# Patient Record
Sex: FEMALE | Race: WHITE | NOT HISPANIC OR LATINO
[De-identification: names, ages, dates, MRNs, and addresses within clinical notes are randomized per-mention and may not be internally consistent; named-entity substitution may affect disease eponyms.]

---

## 2017-08-11 ENCOUNTER — APPOINTMENT (OUTPATIENT)
Dept: OPHTHALMOLOGY | Facility: CLINIC | Age: 57
End: 2017-08-11
Payer: COMMERCIAL

## 2017-08-11 ENCOUNTER — APPOINTMENT (OUTPATIENT)
Dept: OPHTHALMOLOGY | Facility: CLINIC | Age: 57
End: 2017-08-11

## 2017-08-11 DIAGNOSIS — H35.362 DRUSEN (DEGENERATIVE) OF MACULA, LEFT EYE: ICD-10-CM

## 2017-08-11 DIAGNOSIS — H25.13 AGE-RELATED NUCLEAR CATARACT, BILATERAL: ICD-10-CM

## 2017-08-11 PROCEDURE — 92014 COMPRE OPH EXAM EST PT 1/>: CPT

## 2017-08-11 PROCEDURE — 92134 CPTRZ OPH DX IMG PST SGM RTA: CPT

## 2017-08-16 PROBLEM — H35.362 MACULAR DRUSEN, LEFT: Status: ACTIVE | Noted: 2017-08-16

## 2017-08-16 PROBLEM — H25.13 CATARACT, NUCLEAR, BILATERAL: Status: ACTIVE | Noted: 2017-08-16

## 2017-09-11 ENCOUNTER — APPOINTMENT (OUTPATIENT)
Dept: OPHTHALMOLOGY | Facility: CLINIC | Age: 57
End: 2017-09-11
Payer: COMMERCIAL

## 2017-09-11 DIAGNOSIS — H40.003 PREGLAUCOMA, UNSPECIFIED, BILATERAL: ICD-10-CM

## 2017-09-11 PROCEDURE — 92083 EXTENDED VISUAL FIELD XM: CPT

## 2018-05-15 ENCOUNTER — OUTPATIENT (OUTPATIENT)
Dept: OUTPATIENT SERVICES | Facility: HOSPITAL | Age: 58
LOS: 1 days | End: 2018-05-15
Payer: COMMERCIAL

## 2018-05-15 VITALS
TEMPERATURE: 98 F | HEART RATE: 63 BPM | RESPIRATION RATE: 20 BRPM | DIASTOLIC BLOOD PRESSURE: 67 MMHG | OXYGEN SATURATION: 100 % | SYSTOLIC BLOOD PRESSURE: 97 MMHG | HEIGHT: 63 IN | WEIGHT: 115.08 LBS

## 2018-05-15 DIAGNOSIS — G47.30 SLEEP APNEA, UNSPECIFIED: ICD-10-CM

## 2018-05-15 DIAGNOSIS — Z98.891 HISTORY OF UTERINE SCAR FROM PREVIOUS SURGERY: Chronic | ICD-10-CM

## 2018-05-15 DIAGNOSIS — Z01.818 ENCOUNTER FOR OTHER PREPROCEDURAL EXAMINATION: ICD-10-CM

## 2018-05-15 DIAGNOSIS — N20.0 CALCULUS OF KIDNEY: ICD-10-CM

## 2018-05-15 LAB
ANION GAP SERPL CALC-SCNC: 12 MMOL/L — SIGNIFICANT CHANGE UP (ref 5–17)
BUN SERPL-MCNC: 15 MG/DL — SIGNIFICANT CHANGE UP (ref 7–23)
CALCIUM SERPL-MCNC: 9.9 MG/DL — SIGNIFICANT CHANGE UP (ref 8.4–10.5)
CHLORIDE SERPL-SCNC: 101 MMOL/L — SIGNIFICANT CHANGE UP (ref 96–108)
CO2 SERPL-SCNC: 28 MMOL/L — SIGNIFICANT CHANGE UP (ref 22–31)
CREAT SERPL-MCNC: 0.81 MG/DL — SIGNIFICANT CHANGE UP (ref 0.5–1.3)
GLUCOSE SERPL-MCNC: 78 MG/DL — SIGNIFICANT CHANGE UP (ref 70–99)
HCT VFR BLD CALC: 39.2 % — SIGNIFICANT CHANGE UP (ref 34.5–45)
HGB BLD-MCNC: 13 G/DL — SIGNIFICANT CHANGE UP (ref 11.5–15.5)
MCHC RBC-ENTMCNC: 29.3 PG — SIGNIFICANT CHANGE UP (ref 27–34)
MCHC RBC-ENTMCNC: 33.2 GM/DL — SIGNIFICANT CHANGE UP (ref 32–36)
MCV RBC AUTO: 88.5 FL — SIGNIFICANT CHANGE UP (ref 80–100)
PLATELET # BLD AUTO: 211 K/UL — SIGNIFICANT CHANGE UP (ref 150–400)
POTASSIUM SERPL-MCNC: 4 MMOL/L — SIGNIFICANT CHANGE UP (ref 3.5–5.3)
POTASSIUM SERPL-SCNC: 4 MMOL/L — SIGNIFICANT CHANGE UP (ref 3.5–5.3)
RBC # BLD: 4.43 M/UL — SIGNIFICANT CHANGE UP (ref 3.8–5.2)
RBC # FLD: 12.6 % — SIGNIFICANT CHANGE UP (ref 10.3–14.5)
SODIUM SERPL-SCNC: 141 MMOL/L — SIGNIFICANT CHANGE UP (ref 135–145)
WBC # BLD: 5.05 K/UL — SIGNIFICANT CHANGE UP (ref 3.8–10.5)
WBC # FLD AUTO: 5.05 K/UL — SIGNIFICANT CHANGE UP (ref 3.8–10.5)

## 2018-05-15 RX ORDER — CEFAZOLIN SODIUM 1 G
2000 VIAL (EA) INJECTION ONCE
Qty: 0 | Refills: 0 | Status: DISCONTINUED | OUTPATIENT
Start: 2018-05-22 | End: 2018-06-06

## 2018-05-15 NOTE — H&P PST ADULT - RS GEN PE MLT RESP DETAILS PC
normal/respirations non-labored/good air movement/breath sounds equal/clear to auscultation bilaterally/airway patent

## 2018-05-15 NOTE — H&P PST ADULT - PMH
Sleep apnea Asthma  with seasonal allergies  GERD (gastroesophageal reflux disease)    Kidney stone    Sleep apnea  uses  - mild Environmental asthma    GERD (gastroesophageal reflux disease)    Kidney stone    Sleep apnea  uses  - mild

## 2018-05-15 NOTE — H&P PST ADULT - HISTORY OF PRESENT ILLNESS
58 yr old female with history of Recurrent UTI , had work up that revealed left sided kidney stone. Now coming in for Left ESWL, Cystoscopy, Left stent insertion on 5/22/18.

## 2018-05-21 ENCOUNTER — TRANSCRIPTION ENCOUNTER (OUTPATIENT)
Age: 58
End: 2018-05-21

## 2018-05-22 ENCOUNTER — OUTPATIENT (OUTPATIENT)
Dept: OUTPATIENT SERVICES | Facility: HOSPITAL | Age: 58
LOS: 1 days | End: 2018-05-22
Payer: COMMERCIAL

## 2018-05-22 VITALS
SYSTOLIC BLOOD PRESSURE: 97 MMHG | RESPIRATION RATE: 18 BRPM | OXYGEN SATURATION: 100 % | DIASTOLIC BLOOD PRESSURE: 62 MMHG | TEMPERATURE: 97 F | HEART RATE: 58 BPM

## 2018-05-22 VITALS
SYSTOLIC BLOOD PRESSURE: 85 MMHG | HEIGHT: 63 IN | OXYGEN SATURATION: 97 % | TEMPERATURE: 98 F | HEART RATE: 63 BPM | DIASTOLIC BLOOD PRESSURE: 58 MMHG | WEIGHT: 115.08 LBS | RESPIRATION RATE: 18 BRPM

## 2018-05-22 DIAGNOSIS — N20.0 CALCULUS OF KIDNEY: ICD-10-CM

## 2018-05-22 DIAGNOSIS — Z98.891 HISTORY OF UTERINE SCAR FROM PREVIOUS SURGERY: Chronic | ICD-10-CM

## 2018-05-22 PROCEDURE — 87086 URINE CULTURE/COLONY COUNT: CPT

## 2018-05-22 PROCEDURE — C2617: CPT

## 2018-05-22 PROCEDURE — 52356 CYSTO/URETERO W/LITHOTRIPSY: CPT | Mod: LT

## 2018-05-22 PROCEDURE — 76000 FLUOROSCOPY <1 HR PHYS/QHP: CPT

## 2018-05-22 PROCEDURE — 74018 RADEX ABDOMEN 1 VIEW: CPT | Mod: 26

## 2018-05-22 PROCEDURE — 87186 SC STD MICRODIL/AGAR DIL: CPT

## 2018-05-22 PROCEDURE — G0463: CPT

## 2018-05-22 PROCEDURE — 80048 BASIC METABOLIC PNL TOTAL CA: CPT

## 2018-05-22 PROCEDURE — 85027 COMPLETE CBC AUTOMATED: CPT

## 2018-05-22 PROCEDURE — 74018 RADEX ABDOMEN 1 VIEW: CPT

## 2018-05-22 PROCEDURE — C1758: CPT

## 2018-05-22 PROCEDURE — C1769: CPT

## 2018-05-22 RX ORDER — ONDANSETRON 8 MG/1
4 TABLET, FILM COATED ORAL ONCE
Qty: 0 | Refills: 0 | Status: COMPLETED | OUTPATIENT
Start: 2018-05-22 | End: 2018-05-22

## 2018-05-22 RX ORDER — MONTELUKAST 4 MG/1
1 TABLET, CHEWABLE ORAL
Qty: 0 | Refills: 0 | COMMUNITY

## 2018-05-22 RX ORDER — HYDROMORPHONE HYDROCHLORIDE 2 MG/ML
0.5 INJECTION INTRAMUSCULAR; INTRAVENOUS; SUBCUTANEOUS
Qty: 0 | Refills: 0 | Status: DISCONTINUED | OUTPATIENT
Start: 2018-05-22 | End: 2018-05-22

## 2018-05-22 RX ORDER — OMEGA-3 ACID ETHYL ESTERS 1 G
1 CAPSULE ORAL
Qty: 0 | Refills: 0 | COMMUNITY

## 2018-05-22 RX ORDER — SODIUM CHLORIDE 9 MG/ML
1000 INJECTION INTRAMUSCULAR; INTRAVENOUS; SUBCUTANEOUS
Qty: 0 | Refills: 0 | Status: DISCONTINUED | OUTPATIENT
Start: 2018-05-22 | End: 2018-06-06

## 2018-05-22 RX ORDER — MULTIVIT-MIN/FERROUS GLUCONATE 9 MG/15 ML
1 LIQUID (ML) ORAL
Qty: 0 | Refills: 0 | COMMUNITY

## 2018-05-22 RX ORDER — LIDOCAINE HCL 20 MG/ML
0.2 VIAL (ML) INJECTION ONCE
Qty: 0 | Refills: 0 | Status: DISCONTINUED | OUTPATIENT
Start: 2018-05-22 | End: 2018-05-22

## 2018-05-22 RX ORDER — SODIUM CHLORIDE 9 MG/ML
3 INJECTION INTRAMUSCULAR; INTRAVENOUS; SUBCUTANEOUS EVERY 8 HOURS
Qty: 0 | Refills: 0 | Status: DISCONTINUED | OUTPATIENT
Start: 2018-05-22 | End: 2018-05-22

## 2018-05-22 RX ORDER — ASCORBIC ACID 60 MG
1 TABLET,CHEWABLE ORAL
Qty: 0 | Refills: 0 | COMMUNITY

## 2018-05-22 RX ORDER — L.ACIDOPH/B.ANIMALIS/B.LONGUM 15B CELL
1 CAPSULE ORAL
Qty: 0 | Refills: 0 | COMMUNITY

## 2018-05-22 RX ORDER — RALOXIFENE HYDROCHLORIDE 60 MG/1
1 TABLET, COATED ORAL
Qty: 0 | Refills: 0 | COMMUNITY

## 2018-05-22 RX ADMIN — SODIUM CHLORIDE 75 MILLILITER(S): 9 INJECTION INTRAMUSCULAR; INTRAVENOUS; SUBCUTANEOUS at 11:36

## 2018-05-22 RX ADMIN — Medication 0.5 MILLIGRAM(S): at 11:30

## 2018-05-22 RX ADMIN — ONDANSETRON 4 MILLIGRAM(S): 8 TABLET, FILM COATED ORAL at 11:00

## 2018-05-22 NOTE — ASU DISCHARGE PLAN (ADULT/PEDIATRIC). - ITEMS TO FOLLOWUP WITH YOUR PHYSICIAN'S
Please follow up with Dr Goldberg in office in 2 weeks.  Call [phone] too set up an appointment for your left ureteral stent to be removed

## 2018-05-22 NOTE — BRIEF OPERATIVE NOTE - PROCEDURE
<<-----Click on this checkbox to enter Procedure ESWL, cystoscopic, insertion of urinary stent  05/22/2018  left  Active  GGOLDBER

## 2018-05-22 NOTE — ASU DISCHARGE PLAN (ADULT/PEDIATRIC). - MEDICATION SUMMARY - MEDICATIONS TO TAKE
I will START or STAY ON the medications listed below when I get home from the hospital:    sinutrol 1 tab oral daily  -- Indication: For Home medication    green tea oral daily  -- Indication: For Home medication    Percocet 5/325 oral tablet  -- 1 tab(s) by mouth every 6 hours, As Needed -for muscle spasm - for moderate pain MDD:4  -- Caution federal law prohibits the transfer of this drug to any person other  than the person for whom it was prescribed.  May cause drowsiness.  Alcohol may intensify this effect.  Use care when operating dangerous machinery.  This prescription cannot be refilled.  This product contains acetaminophen.  Do not use  with any other product containing acetaminophen to prevent possible liver damage.  Using more of this medication than prescribed may cause serious breathing problems.    -- Indication: For post op pain control    Evista 60 mg oral tablet  -- 1 tab(s) by mouth once a day  -- Indication: For Home medication    TheraCran One oral capsule  -- 1 cap(s) by mouth once a day  -- Indication: For Home medication    Singulair 10 mg oral tablet  -- 1 tab(s) by mouth once a day  -- Indication: For Home medication    Omega Essentials 667 mg oral capsule  -- 1 cap(s) by mouth 2 times a day  -- Indication: For Home medication    Probiotic Formula oral capsule  -- 1 cap(s) by mouth once a day  -- Indication: For Home medication    TheraLith XR oral tablet  -- 1 tab(s) by mouth once a day  -- Indication: For Home medication    Calcium 500+D oral tablet, chewable  -- 1 tab(s) by mouth 2 times a day  -- Indication: For Home medication    Sandy-C 1000 mg oral tablet  -- 1 tab(s) by mouth once a day  -- Indication: For Home medication

## 2018-05-22 NOTE — ASU DISCHARGE PLAN (ADULT/PEDIATRIC). - INSTRUCTIONS
Regular diet Call [phone] to set up an appointment in 2 weeks for left ureteral stent removal

## 2019-10-02 ENCOUNTER — APPOINTMENT (OUTPATIENT)
Dept: OPHTHALMOLOGY | Facility: CLINIC | Age: 59
End: 2019-10-02

## 2020-12-07 ENCOUNTER — TRANSCRIPTION ENCOUNTER (OUTPATIENT)
Age: 60
End: 2020-12-07

## 2021-07-13 ENCOUNTER — TRANSCRIPTION ENCOUNTER (OUTPATIENT)
Age: 61
End: 2021-07-13

## 2021-07-13 PROBLEM — K21.9 GASTRO-ESOPHAGEAL REFLUX DISEASE WITHOUT ESOPHAGITIS: Chronic | Status: ACTIVE | Noted: 2018-05-15

## 2021-07-13 PROBLEM — G47.30 SLEEP APNEA, UNSPECIFIED: Chronic | Status: ACTIVE | Noted: 2018-05-15

## 2021-07-13 PROBLEM — N20.0 CALCULUS OF KIDNEY: Chronic | Status: ACTIVE | Noted: 2018-05-15

## 2021-07-13 PROBLEM — J45.909 UNSPECIFIED ASTHMA, UNCOMPLICATED: Chronic | Status: ACTIVE | Noted: 2018-05-15

## 2021-07-15 ENCOUNTER — NON-APPOINTMENT (OUTPATIENT)
Age: 61
End: 2021-07-15

## 2021-07-15 ENCOUNTER — APPOINTMENT (OUTPATIENT)
Dept: ORTHOPEDIC SURGERY | Facility: CLINIC | Age: 61
End: 2021-07-15
Payer: COMMERCIAL

## 2021-07-15 DIAGNOSIS — S52.291A OTHER FRACTURE OF SHAFT OF RIGHT ULNA, INITIAL ENCOUNTER FOR CLOSED FRACTURE: ICD-10-CM

## 2021-07-15 PROCEDURE — 99072 ADDL SUPL MATRL&STAF TM PHE: CPT

## 2021-07-15 PROCEDURE — 29065 APPL CST SHO TO HAND LNG ARM: CPT | Mod: RT

## 2021-07-15 PROCEDURE — 73090 X-RAY EXAM OF FOREARM: CPT | Mod: RT

## 2021-07-15 PROCEDURE — 99203 OFFICE O/P NEW LOW 30 MIN: CPT | Mod: 25

## 2021-07-15 NOTE — ADDENDUM
[FreeTextEntry1] : I, Emily Leon wrote this note acting as a scribe for Dr. Mars Reynolds on Jul 15, 2021.\par \par

## 2021-07-15 NOTE — HISTORY OF PRESENT ILLNESS
[de-identified] : RADHA MARTÍNEZ is a LHD 61 year female who presents for initial evaluation of the right wrist. Patient reports she banged her arm. She went to Urgent Care on 7/13/21, where xrays were taken and a splint and sling were applied. The xrays revealed a comminuted fracture at the midshaft of the ulna. She was treated with a splint.She  presents today for further treatment options.

## 2021-07-15 NOTE — PHYSICAL EXAM
[de-identified] : Patient is WDWN, alert, and in no acute distress. Breathing is unlabored. She is grossly oriented to person, place, \par and time.\par \par Right Forearm:\par ecchymosis , edema and tenderness along midshaft ulna\par shoulder and elbow FROM\par Pron/sup limited by pain\par FROM of digits\par Sensation is normal. [de-identified] : EXAM: FOREARM RIGHT - 7/13/21\par IMPRESSION: \par There is a comminuted fracture at the midshaft of the ulna. There is a small minimally displaced butterfly fracture fragment. There is slight apex dorsal lateral angulation. 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 Jul 14 2021 6:45AM\par \par \par \par 7/15/21 - AP, lateral  views of the right forearm were obtained today and revealed a comminuted fracture at the midshaft  ulna. The fracture is well-aligned.

## 2021-07-15 NOTE — END OF VISIT
[FreeTextEntry3] : All medical record entries made by the Scribe were at my,  Dr. Mars Reynolds MD., direction and personally dictated by me on 07/15/2021. I have personally reviewed the chart and agree that the record accurately reflects my personal performance of the history, physical exam, assessment and plan.\par \par

## 2021-07-15 NOTE — DISCUSSION/SUMMARY
[de-identified] : The underlying pathophysiology was reviewed with the patient. XR films were reviewed with the patient. Discussed at length the nature of the patient’s condition. The the right forearm symptoms are secondary to comminuted fracture at the midshaft of the ulna.\par \par Risks and benefits of surgical versus nonsurgical intervention(cast treatment) were discussed. I am not recommending surgery at this time due to the fracture being well-aligned. The patient has agreed to proceed with being treated in a cast. She was advised that she will be cased for a total of 6 weeks.\par A right long arm Treynor cast was applied. Proper cast care instructions were reviewed.\par Patient advised to use the arm while casted for all ADLs.\par \par Patient can continue activities as tolerated. All questions answered, understanding verbalized. Patient in agreement with plan of care. Follow up in 2 weeks for repeat XRs in the cast to confirm satisfactory fracture alignment..

## 2021-07-29 ENCOUNTER — APPOINTMENT (OUTPATIENT)
Dept: ORTHOPEDIC SURGERY | Facility: CLINIC | Age: 61
End: 2021-07-29
Payer: COMMERCIAL

## 2021-07-29 PROCEDURE — 99213 OFFICE O/P EST LOW 20 MIN: CPT

## 2021-07-29 PROCEDURE — 73090 X-RAY EXAM OF FOREARM: CPT | Mod: RT

## 2021-07-29 NOTE — PHYSICAL EXAM
[de-identified] : Patient is WDWN, alert, and in no acute distress. Breathing is unlabored. She is grossly oriented to person, place, and time.\par \par Right Forearm:\par Right short arm cast in place\par Unable to access ROM due to cast\par Digits are moving freely with brisk capillary refill\par FROM of digits\par  [de-identified] : AP, lateral views of the right forearm were obtained today and revealed a comminuted fracture at the midshaft ulna. The fracture is well-aligned.

## 2021-07-29 NOTE — END OF VISIT
[FreeTextEntry3] : All medical record entries made by the Scribe were at my,  Dr. Mars Reynolds MD., direction and personally dictated by me on 07/29/2021. I have personally reviewed the chart and agree that the record accurately reflects my personal performance of the history, physical exam, assessment and plan.\par \par

## 2021-07-29 NOTE — HISTORY OF PRESENT ILLNESS
[de-identified] : RADHA MARTÍNEZ is a LHD 61 year female who presents for follow up valuation of the right wrist. Patient reports she banged her arm. She went to Urgent Care on 7/13/21, where xrays were taken and a splint and sling were applied. The xrays revealed a comminuted fracture at the midshaft of the ulna. She presented to the office on 7/15/21 where she was treated in a short arm cast. She returns on 7/29/21 stating she overall is doing well. She is concerned of atrophy of the arm. She additionally states the cast is cutting into her arm and would like it adjusted or changed.

## 2021-07-29 NOTE — DISCUSSION/SUMMARY
[de-identified] : The underlying pathophysiology was reviewed with the patient. XR films were reviewed with the patient. Discussed at length the nature of the patient’s condition. The the right forearm symptoms are secondary to comminuted fracture at the midshaft of the ulna.\par \par Right short arm cast was adjusted (7/29/21).\par After review of XRs, the patient was advised the fracture is still well aligned and is healing. I feel strongly that with continued treatment of the fracture in a cast, the patient will regain full function.\par Instructed on ROM exercises of the shoulder, elbow, hand and wrist while casted.\par \par Patient can continue activities as tolerated. All questions answered, understanding verbalized. Patient in agreement with plan of care. Follow up in 2 weeks for repeat xrays to check alignment.

## 2021-07-29 NOTE — ADDENDUM
[FreeTextEntry1] : I, Emily Leon wrote this note acting as a scribe for Dr. Mars Reynolds on Jul 29, 2021.\par \par

## 2021-08-03 ENCOUNTER — APPOINTMENT (OUTPATIENT)
Dept: ORTHOPEDIC SURGERY | Facility: CLINIC | Age: 61
End: 2021-08-03
Payer: COMMERCIAL

## 2021-08-03 PROCEDURE — 99214 OFFICE O/P EST MOD 30 MIN: CPT | Mod: 25

## 2021-08-03 PROCEDURE — 29075 APPL CST ELBW FNGR SHORT ARM: CPT | Mod: RT

## 2021-08-03 NOTE — DISCUSSION/SUMMARY
[de-identified] : The underlying pathophysiology was reviewed with the patient. XR films were reviewed with the patient. Discussed at length the nature of the patient’s condition. The the right forearm symptoms are secondary to comminuted fracture at the midshaft of the ulna.\par \par Right short arm cast was removed and a new short arm waterproof cast was applied.\par \par Activity modifications and restrictions were discussed. Discussed she should only be swimming with a kick board. \par \par FU 2 week with Dr. Reynolds\par \par All questions were answered, all alternatives discussed and the patient is in complete agreement with that plan. Follow-up appointment as instructed. Any issues and the patient will call or come in sooner. \par

## 2021-08-03 NOTE — PROCEDURE
[de-identified] : A short arm cast was applied in clinic today. Neurovascular status was assessed pre and post placement. Patient tolerated the cast placement well, with no complaints.

## 2021-08-03 NOTE — PHYSICAL EXAM
[de-identified] : Right Upper Extremity\par o Elbow :\par ¦ Inspection/Palpation : no tenderness, no swelling, no deformities\par ¦ Range of Motion : full and painless in all planes, no crepitus\par ¦ Strength : flexion and extension 5/5\par ¦ Stability : no joint instability on provocative testing\par \par o Wrist:\par ¦ Inspection/Palpation : diffuse ulnar tenderness to palpation, no swelling or deformities\par ¦ Range of Motion : not assessed today due to fracture \par ¦ Strength : extension, flexion, ulnar deviation and radial deviation - not assessed today due to fracture. \par ¦ Stability : no joint instability on provocative testing\par ¦ Tests/Signs : Tinel's sign (-) over carpal tunnel\par o Muscle Bulk : no atrophy\par o Sensation : sensation intact to light touch\par o Skin : no skin lesions or discoloration\par o Vascular Exam : no edema or cyanosis, radial and ulnar pulses normal\par \par Left Upper Extremity\par o Wrist:\par ¦ Inspection/Palpation : no tenderness, swelling or deformities\par ¦ Range of Motion : full and painless in all planes, no crepitus\par ¦ Strength : extension, flexion, ulnar deviation and radial deviation 5/5\par ¦ Stability : no joint instability on provocative testing\par ¦ Tests/Signs : Tinel's sign (-) over carpal tunnel\par o Muscle Bulk : no atrophy\par o Sensation : sensation intact to light touch\par o Skin : no skin lesions or discoloration\par o Vascular Exam : no edema or cyanosis, radial and ulnar pulses normal\par  [de-identified] : Patient comes to today's visit with outside imaging already performed. I reviewed the images in detail with the patient and discussed the findings as highlighted below. \par \par o 7/15/21 - AP, lateral views of the right forearm were obtained in clinic by Dr. Reynolds and revealed:\par ¦  a comminuted fracture at the midshaft ulna. The fracture is well-aligned. \par \par o FOREARM RIGHT xray performed on 7/13/21 [facility]: impression: \par ¦ There is a comminuted fracture at the midshaft of the ulna, There is a small minimally displaced butterfly fracture fragment, There is slight apex dorsal lateral angulation. \par ¦ Chondrocalcinosis at the triangular fibrocartilage is noted.\par \par

## 2021-08-03 NOTE — HISTORY OF PRESENT ILLNESS
[de-identified] : RADHA MARTÍNEZ is a LHD 61 year female who presents for follow up valuation of the right wrist. Patient reports she banged her arm. She went to Urgent Care on 7/13/21, where xrays were taken and a splint and sling were applied. The xrays revealed a comminuted fracture at the midshaft of the ulna. She presented to the office on 7/15/21 where she was  evaluated treated by Dr. Reynolds in a short arm cast. She return to clinic with Dr. Reynolds on 7/29/21 stating the cast is cutting into her arm and would like it adjusted or changed. The cast was adjusted at this time. She returns today due to concerns of continued pain due to the cast. She would like to have the cast cut off and reapplied at this time. Patient states she has been swimming and notes the cast becomes wet. Patient denies any other complaints at this time.

## 2021-08-16 ENCOUNTER — APPOINTMENT (OUTPATIENT)
Dept: ORTHOPEDIC SURGERY | Facility: CLINIC | Age: 61
End: 2021-08-16
Payer: COMMERCIAL

## 2021-08-16 PROCEDURE — 99213 OFFICE O/P EST LOW 20 MIN: CPT

## 2021-08-16 PROCEDURE — 73090 X-RAY EXAM OF FOREARM: CPT | Mod: RT

## 2021-08-16 NOTE — ADDENDUM
[FreeTextEntry1] : I, Emily Leon wrote this note acting as a scribe for Dr. Mars Reynolds on Aug 16, 2021.\par \par

## 2021-08-16 NOTE — END OF VISIT
[FreeTextEntry3] : All medical record entries made by the Scribe were at my,  Dr. Mars Reynolds MD., direction and personally dictated by me on 08/16/2021. I have personally reviewed the chart and agree that the record accurately reflects my personal performance of the history, physical exam, assessment and plan.

## 2021-08-16 NOTE — PHYSICAL EXAM
[de-identified] : Patient is WDWN, alert, and in no acute distress. Breathing is unlabored. She is grossly oriented to person, place, and time.\par \par Right Forearm:\par Right short arm cast in place\par elbow and hand FROM\par Digits are moving freely with brisk capillary refill\par Pronation is full, with limitations into supination.\par Limited ROM of elbow into flexion. Extension is full. [de-identified] : AP, lateral views of the right forearm were obtained today and revealed a comminuted fracture at the midshaft ulna. The fracture is well-aligned. Fracture is healing, fracture line is still evident.

## 2021-08-16 NOTE — HISTORY OF PRESENT ILLNESS
[de-identified] : RADHA MARTÍNEZ is a LHD 61 year female who presents for follow up valuation of the right wrist. Patient reports she banged her arm. She went to Urgent Care on 7/13/21, where xrays were taken and a splint and sling were applied. The xrays revealed a comminuted fracture at the midshaft of the ulna. She presented to the office on 7/15/21 where she was treated in a short arm cast. She returned on 7/29/21 stating she overall is doing well. She is concerned of atrophy of the arm. She additionally states the cast is cutting into her arm and would like it adjusted or changed. She presents on 8/16/21, for repeat xrays and alignment check. She would like to inquire about possibly returning to swimming while casted.

## 2021-08-16 NOTE — DISCUSSION/SUMMARY
[de-identified] : The underlying pathophysiology was reviewed with the patient. XR films were reviewed with the patient. Discussed at length the nature of the patient’s condition. The the right forearm symptoms are secondary to comminuted fracture at the midshaft of the ulna.\par \par After review of new imaging, I am recommending she continue with the cast for at least another 2 weeks. \par Operative management is not indicated at this time as healing is taking the place and the fracture has not displaced.\par She was instructed on ROM exercises of the elbow, specifically into flexion.\par She may return to swimming as tolerated. But she was advised to use caution.\par \par Patient can continue activities as tolerated. All questions answered, understanding verbalized. Patient in agreement with plan of care. Follow up in 2 weeks for repeat xrays. Xrays in the cast prior to cast removal as she might need to continue with the cast depending on the imaging.

## 2021-08-26 ENCOUNTER — APPOINTMENT (OUTPATIENT)
Dept: ORTHOPEDIC SURGERY | Facility: CLINIC | Age: 61
End: 2021-08-26
Payer: COMMERCIAL

## 2021-08-26 ENCOUNTER — TRANSCRIPTION ENCOUNTER (OUTPATIENT)
Age: 61
End: 2021-08-26

## 2021-08-26 VITALS — WEIGHT: 115 LBS | BODY MASS INDEX: 20.38 KG/M2 | HEIGHT: 63 IN

## 2021-08-26 PROCEDURE — 29125 APPL SHORT ARM SPLINT STATIC: CPT | Mod: RT

## 2021-08-26 PROCEDURE — 99213 OFFICE O/P EST LOW 20 MIN: CPT | Mod: 25

## 2021-08-26 PROCEDURE — 73090 X-RAY EXAM OF FOREARM: CPT | Mod: RT

## 2021-08-26 NOTE — HISTORY OF PRESENT ILLNESS
[de-identified] : RADHA MARTÍNEZ is a LHD 61 year female who presents for follow up valuation of the right wrist. Patient reports she banged her arm. She went to Urgent Care on 7/13/21, where xrays were taken and a splint and sling were applied. The xrays revealed a comminuted fracture at the midshaft of the ulna. She presented to the office on 7/15/21 where she was treated in a short arm cast. She presents on 8/26/21 for repeat xrays. She is concerned about cast removal as she might feel vulnerable without the cast. She notes she has swam a few times this week without pain in the forearm.

## 2021-08-26 NOTE — DISCUSSION/SUMMARY
[de-identified] : The underlying pathophysiology was reviewed with the patient. XR films were reviewed with the patient. Discussed at length the nature of the patient’s condition. The the right forearm symptoms are secondary to comminuted fracture at the midshaft of the ulna.\par \par Right short arm cast removed (8/26/21).\par She was placed in a removable splint for support and protection. \par She was advised to use caution as to not fall. \par She was instructed to avoid lifting anything over 5 lbs as well as any weight bearing activity.\par She was advised to soak the wrist and hand in warm water and Epsom salts. \par The patient wishes to proceed with hand therapy of the right forearm. A script was given.\par \par Patient can continue activities as tolerated. All questions answered, understanding verbalized. Patient in agreement with plan of care. Follow up in 2 weeks for repeat xrays.

## 2021-08-26 NOTE — END OF VISIT
[FreeTextEntry3] : All medical record entries made by the Scribe were at my,  Dr. Mars Reynolds MD., direction and personally dictated by me on 08/26/2021. I have personally reviewed the chart and agree that the record accurately reflects my personal performance of the history, physical exam, assessment and plan.

## 2021-08-26 NOTE — PHYSICAL EXAM
[de-identified] : Patient is WDWN, alert, and in no acute distress. Breathing is unlabored. She is grossly oriented to person, place, and time.\par \par Right Forearm:\par Right short arm cast removed on 8/26/21.\par No tenderness over the midshaft of the ulnar.\par FROM of the digits.\par Pronation is full, with limitations into supination.\par Limited ROM of elbow into flexion. Extension is full. \par Wrist: Limitations of movement due to being casted for 6 weeks.  [de-identified] : AP, lateral views of the right forearm were obtained today and revealed a comminuted fracture at the midshaft ulna. The fracture is well-aligned. Fracture is healing, fracture line is still evident. \par

## 2021-08-26 NOTE — ADDENDUM
[FreeTextEntry1] : I, Emily Leon wrote this note acting as a scribe for Dr. Mars Reynolds on Aug 26, 2021.

## 2021-09-13 ENCOUNTER — APPOINTMENT (OUTPATIENT)
Dept: ORTHOPEDIC SURGERY | Facility: CLINIC | Age: 61
End: 2021-09-13
Payer: COMMERCIAL

## 2021-09-13 PROCEDURE — 99213 OFFICE O/P EST LOW 20 MIN: CPT

## 2021-09-13 PROCEDURE — 73090 X-RAY EXAM OF FOREARM: CPT | Mod: RT

## 2021-09-13 NOTE — HISTORY OF PRESENT ILLNESS
[de-identified] : RADHA MARTÍNEZ is a 61 year female presents for follow-up evaluation of a comminuted fracture at the midshaft of the ulna. She has recently began PT and states she only has had two visits thus far. She notes pain and discomfort even at rest and with all activities of daily living. She notes a "low-grade" throbbing sensation to the forearm even at rest.She feels her motion is improving but reports difficulty with strength. She was advised by her therapist to rest as her pain and discomfort had been increasing.

## 2021-09-13 NOTE — END OF VISIT
[FreeTextEntry3] : All medical record entries made by the Scribe were at my,  Dr. Mars Reynolds MD., direction and personally dictated by me on 09/13/2021. I have personally reviewed the chart and agree that the record accurately reflects my personal performance of the history, physical exam, assessment and plan.

## 2021-09-13 NOTE — ADDENDUM
[FreeTextEntry1] : I, Emily Leon wrote this note acting as a scribe for Dr. Mars Reynolds on Sep 13, 2021.

## 2021-09-13 NOTE — PHYSICAL EXAM
[de-identified] : Patient is WDWN, alert, and in no acute distress. Breathing is unlabored. She is grossly oriented to person, place, and time.\par \par Right Forearm:\par Right short arm cast removed on 8/26/21.\par Tenderness over the midshaft of the ulnar.\par FROM of the digits.\par Pronation is full, with limitations into supination.\par Limited ROM of elbow into flexion. Extension is full.  [de-identified] : AP, lateral views of the right forearm were obtained today and revealed a comminuted fracture at the midshaft ulna. The fracture is well-aligned. Fracture line is still evident.

## 2021-09-13 NOTE — DISCUSSION/SUMMARY
[de-identified] : The underlying pathophysiology was reviewed with the patient. XR films were reviewed with the patient. Discussed at length the nature of the patient’s condition. The the right forearm symptoms are secondary to comminuted fracture at the midshaft of the ulna.\par \par After review of XR taken today, 9/13/21 I am recommending that she receive further imaging such as a CT as the fracture is not fully healing. 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  A script was given.\par She was advised of continued activity modification as well as rest.\par She was strongly advised not to lift weights as well as avoid WB activities such as pushups and yoga.\par She was advised to take Calcium Citrate, Vitamin D3 and Vitamin C to promote healing of the fracture. \par \par Patient can continue activities as tolerated. All questions answered, understanding verbalized. Patient in agreement with plan of care. She will follow up in 4 weeks for a repeat XR if she does not receive the CT.

## 2021-09-21 ENCOUNTER — APPOINTMENT (OUTPATIENT)
Dept: CT IMAGING | Facility: CLINIC | Age: 61
End: 2021-09-21

## 2021-09-24 ENCOUNTER — OUTPATIENT (OUTPATIENT)
Dept: OUTPATIENT SERVICES | Facility: HOSPITAL | Age: 61
LOS: 1 days | End: 2021-09-24
Payer: COMMERCIAL

## 2021-09-24 ENCOUNTER — APPOINTMENT (OUTPATIENT)
Dept: CT IMAGING | Facility: IMAGING CENTER | Age: 61
End: 2021-09-24
Payer: COMMERCIAL

## 2021-09-24 ENCOUNTER — RESULT REVIEW (OUTPATIENT)
Age: 61
End: 2021-09-24

## 2021-09-24 DIAGNOSIS — S52.201D UNSPECIFIED FRACTURE OF SHAFT OF RIGHT ULNA, SUBSEQUENT ENCOUNTER FOR CLOSED FRACTURE WITH ROUTINE HEALING: ICD-10-CM

## 2021-09-24 DIAGNOSIS — Z98.891 HISTORY OF UTERINE SCAR FROM PREVIOUS SURGERY: Chronic | ICD-10-CM

## 2021-09-24 PROCEDURE — 73200 CT UPPER EXTREMITY W/O DYE: CPT | Mod: 26,RT

## 2021-09-24 PROCEDURE — 73200 CT UPPER EXTREMITY W/O DYE: CPT

## 2021-09-24 PROCEDURE — 76376 3D RENDER W/INTRP POSTPROCES: CPT

## 2021-09-24 PROCEDURE — 76376 3D RENDER W/INTRP POSTPROCES: CPT | Mod: 26

## 2021-09-29 ENCOUNTER — NON-APPOINTMENT (OUTPATIENT)
Age: 61
End: 2021-09-29

## 2021-10-07 ENCOUNTER — APPOINTMENT (OUTPATIENT)
Dept: ORTHOPEDIC SURGERY | Facility: CLINIC | Age: 61
End: 2021-10-07
Payer: COMMERCIAL

## 2021-10-07 PROCEDURE — 99213 OFFICE O/P EST LOW 20 MIN: CPT

## 2021-10-07 NOTE — HISTORY OF PRESENT ILLNESS
[de-identified] : RADHA MARTÍNEZ is a 61 year female presents for evaluation of a right elbow cyst which was removed on 9/27/21 by Dr. Schreiber. She presents on 10/7/21 with concerns of possible infection. She notes she was put on a course of Keflex which she began on Tuesday night. She notes the elbow is red and swollen although it has improved since being placed on Keflex. She notes the wound was cultured and she is awaiting the results. With regard to the midshaft of the ulna fracture, she has ceased therapy in the interim due to the cyst removal.

## 2021-10-07 NOTE — DISCUSSION/SUMMARY
[de-identified] : The underlying pathophysiology was reviewed with the patient. XR films were reviewed with the patient. Discussed at length the nature of the patient’s condition. \par \par With regard to the right elbow cyst, I advised her to follow up with Dr. Falcon.\par The wound was re-dressed today in office.\par She was advised to continue with the course of Keflex.\par She was instructed on activity modification and rest with regard to the right elbow cyst.\par She may was advised to hold off on therapy with regard to the midshaft ulna fracture due to the cyst.\par \par Patient can continue activities as tolerated. All questions answered, understanding verbalized. Patient in agreement with plan of care.

## 2021-10-07 NOTE — ADDENDUM
[FreeTextEntry1] : I, Emily Leon wrote this note acting as a scribe for Dr. Mars Reynolds on Oct 07, 2021.

## 2021-10-07 NOTE — END OF VISIT
[FreeTextEntry3] : All medical record entries made by the Scribe were at my,  Dr. Mars Reynolds MD., direction and personally dictated by me on 10/07/2021. I have personally reviewed the chart and agree that the record accurately reflects my personal performance of the history, physical exam, assessment and plan.

## 2021-11-01 ENCOUNTER — APPOINTMENT (OUTPATIENT)
Dept: ORTHOPEDIC SURGERY | Facility: CLINIC | Age: 61
End: 2021-11-01
Payer: COMMERCIAL

## 2021-11-01 VITALS — BODY MASS INDEX: 20.38 KG/M2 | WEIGHT: 115 LBS | HEIGHT: 63 IN

## 2021-11-01 DIAGNOSIS — S52.201D UNSPECIFIED FRACTURE OF SHAFT OF RIGHT ULNA, SUBSEQUENT ENCOUNTER FOR CLOSED FRACTURE WITH ROUTINE HEALING: ICD-10-CM

## 2021-11-01 PROCEDURE — 99213 OFFICE O/P EST LOW 20 MIN: CPT

## 2021-11-01 PROCEDURE — 73090 X-RAY EXAM OF FOREARM: CPT | Mod: RT

## 2021-11-01 RX ORDER — BUDESONIDE AND FORMOTEROL FUMARATE DIHYDRATE 160; 4.5 UG/1; UG/1
160-4.5 AEROSOL RESPIRATORY (INHALATION)
Qty: 10 | Refills: 0 | Status: ACTIVE | COMMUNITY
Start: 2021-07-08

## 2021-11-01 RX ORDER — ESTRADIOL 10 UG/1
10 TABLET, FILM COATED VAGINAL
Qty: 8 | Refills: 0 | Status: ACTIVE | COMMUNITY
Start: 2021-08-04

## 2021-11-01 RX ORDER — MONTELUKAST 10 MG/1
10 TABLET, FILM COATED ORAL
Qty: 30 | Refills: 0 | Status: ACTIVE | COMMUNITY
Start: 2021-04-21

## 2021-11-01 NOTE — ADDENDUM
[FreeTextEntry1] : I, Emily Leon wrote this note acting as a scribe for Dr. Mars Reynolds on Nov 01, 2021.

## 2021-11-01 NOTE — HISTORY OF PRESENT ILLNESS
[de-identified] : RADHA MARTÍNEZ is a 61 year female presents for evaluation of a right elbow cyst which was removed on 9/27/21 by Dr. Schreiber. She presents on 10/7/21 with concerns of possible infection. She notes she was put on a course of Keflex. She notes the elbow is red and swollen although it has improved since being placed on Keflex. She noted at  the time of her last visit that the wound was cultured and she is awaiting the results. She returns on 11/1/21 stating the elbow infection has cleared up and there is no longer a wound present. With regard to the midshaft of the ulna fracture, she notes continued weakness but denies pain. She has pain and difficulty with rotation of the forearm. She is out of therapy due to the cyst.

## 2021-11-01 NOTE — DISCUSSION/SUMMARY
[de-identified] : The underlying pathophysiology was reviewed with the patient. XR films were reviewed with the patient. Discussed at length the nature of the patient’s condition. The the right forearm symptoms are secondary to comminuted fracture at the midshaft of the ulna.\par \par At this time, as the fracture of the right ulna is fully healed, she was advised that she may return to activities as tolerated. She may return to yoga and light weight lifting as there is not risk of displacing the fracture.\par A new prescription was provided for hand therapy with regard to the right forearm and elbow.\par \par All questions answered, understanding verbalized. Patient in agreement with plan of care. Follow up as needed.

## 2021-11-01 NOTE — PHYSICAL EXAM
[de-identified] : Patient is WDWN, alert, and in no acute distress. Breathing is unlabored. She is grossly oriented to person, place, and time.\par \par Right Elbow:\par Wound healed. No signs of infection.\par No tenderness over the midshaft ulna\par FROM of the digits and wrist\par Improved ROM of the elbow with pain elicited into supination and pronation. Extension and flexion are full [de-identified] : AP, lateral views of the right forearm were obtained today and revealed a comminuted fracture at the midshaft ulna. The fracture is healed.

## 2021-11-01 NOTE — END OF VISIT
[FreeTextEntry3] : All medical record entries made by the Scribe were at my,  Dr. Mars Reynolds MD., direction and personally dictated by me on 11/01/2021. I have personally reviewed the chart and agree that the record accurately reflects my personal performance of the history, physical exam, assessment and plan.

## 2022-07-19 PROBLEM — H40.003 GLAUCOMA SUSPECT OF BOTH EYES: Status: ACTIVE | Noted: 2017-08-16

## 2022-11-30 ENCOUNTER — APPOINTMENT (OUTPATIENT)
Dept: ENDOCRINOLOGY | Facility: CLINIC | Age: 62
End: 2022-11-30

## 2022-11-30 VITALS
DIASTOLIC BLOOD PRESSURE: 60 MMHG | RESPIRATION RATE: 12 BRPM | HEIGHT: 63 IN | OXYGEN SATURATION: 97 % | TEMPERATURE: 97.2 F | SYSTOLIC BLOOD PRESSURE: 100 MMHG | WEIGHT: 116 LBS | HEART RATE: 70 BPM | BODY MASS INDEX: 20.55 KG/M2

## 2022-11-30 PROCEDURE — 99204 OFFICE O/P NEW MOD 45 MIN: CPT

## 2022-11-30 RX ORDER — AZITHROMYCIN 250 MG/1
250 TABLET, FILM COATED ORAL
Qty: 6 | Refills: 0 | Status: DISCONTINUED | COMMUNITY
Start: 2021-07-08 | End: 2022-11-30

## 2022-11-30 RX ORDER — MELOXICAM 7.5 MG/1
7.5 TABLET ORAL
Qty: 30 | Refills: 0 | Status: DISCONTINUED | COMMUNITY
Start: 2021-07-13 | End: 2022-11-30

## 2022-11-30 RX ORDER — CEPHALEXIN 500 MG/1
500 CAPSULE ORAL
Qty: 40 | Refills: 0 | Status: DISCONTINUED | COMMUNITY
Start: 2021-10-17 | End: 2022-11-30

## 2022-12-01 LAB
25(OH)D3 SERPL-MCNC: 35.2 NG/ML
ALBUMIN SERPL ELPH-MCNC: 4.5 G/DL
ALP BLD-CCNC: 81 U/L
ALT SERPL-CCNC: 14 U/L
ANION GAP SERPL CALC-SCNC: 12 MMOL/L
AST SERPL-CCNC: 23 U/L
BILIRUB SERPL-MCNC: 0.5 MG/DL
BUN SERPL-MCNC: 16 MG/DL
CALCIUM SERPL-MCNC: 10 MG/DL
CALCIUM SERPL-MCNC: 10 MG/DL
CHLORIDE SERPL-SCNC: 101 MMOL/L
CO2 SERPL-SCNC: 27 MMOL/L
CREAT SERPL-MCNC: 0.81 MG/DL
EGFR: 82 ML/MIN/1.73M2
GLUCOSE SERPL-MCNC: 72 MG/DL
PARATHYROID HORMONE INTACT: 44 PG/ML
PHOSPHATE SERPL-MCNC: 3.5 MG/DL
POTASSIUM SERPL-SCNC: 4.2 MMOL/L
PROT SERPL-MCNC: 6.8 G/DL
SODIUM SERPL-SCNC: 140 MMOL/L

## 2022-12-03 NOTE — ASSESSMENT
[Bisphosphonate Therapy] : Risks and benefits of bisphosphonate therapy were  discussed with the patient including gastroesophageal irritation, osteonecrosis of the jaw, and atypical femur fractures, and acute phase reaction [Bisphosphonates] : The patient was instructed to take bisphosphonates on an empty stomach with a full glass of water,and wait at least 30 minutes before eating or lying down [FreeTextEntry1] : Barbra Toledo is a 62 year old female with osteoporosis . \par \par Pt reports that she was made aware of osteoporosis after a bone density which showed pt had low bone density . Pt was previously treated with Fosamax for many years. Pt then transitioned to Evista  by Dr. Yousif. BMD May 2022 indicates there is osteoporosis present in the spine as well as the femoral neck. \par \par Pt reports having an ankle fx, ulna fx. No family hx of osteoporosis \par \par Hx of estrogen replacement therapy. \par \par Pt has a hx of kidney stones. No recent recurrences. Pt is following with urology. \par Patient advised for an increased risk of future fx. Options for medical therapy discussed in detail. I discussed Rx with bisphosphonate therapy, including Fosamax, Actonel, Boniva, and IV Reclast. Risks and benefits of bisphosphonate therapy were discussed with the patient including minor aches, and pains, heartburn, gastroesophageal irritation ( excluding IV Reclast), osteonecrosis of the jaw, atypical femur fractures, and acute phase reaction (w/IV Reclast only). Pt would benefit from bisphosphonate therapy with the exception of a drug holiday. within 5 years. I discussed Rx with twice a year Prolia. Risks and benefits discussed including thigh pain, interval fx, and ONJ. I discussed that pt cannot stop Prolia without expecting rapid bone loss and increase in risk for future fx unless they transition to another Rx. Furthermore, there is a 10 year safety data for Prolia. Pt can consider bone anabolic therapy for more aggressive one building treatment w/ Forteo and Tymlos.Risks and benefits discussed including blood Ca elevation, lightheadedness, palpitations, or dizziness. In animal models long term use has shown increased risk for bone Ca, though never seen in humans. Pt would be on this treatment for < 2 years followed by another osteoporosis Rx to prevent rapid bone loss. Pt can transition to bisphosphonate therapy or Prolia after taking Tymlos or Forteo after <2 years. I also discussed monthly Evenity (romosozumab), an anti-sclersotin antibody given fr 1 year. Risks and benefits discussed including cardiovascular issues. All questions were answered. Rx information handout provided. Pt understands and elects to start Actonel medication. Medication instructions were reviewed. Prescription was sent out. \par \par Pharmacological Management of Osteoporosis in Postmenopausal Women: An Endocrine Society  Clinical Practice Guideline. Edgardo R, Peter TIPTON., Balwinder DM, Davin AM, Elena MH, Luis Alberto ESPARZA. J Clin Endocrinol Metab. 2019  May 1;104(5):9607-1446, Also see  Update Luis Alberto et al JCEM 2020, 105: [phone] \par Follow up in 4 months

## 2022-12-03 NOTE — PROCEDURE
[FreeTextEntry1] : Bone Density August 2022 \par Spine -3.0 osteoporosis \par Total Hip -2.3 osteopenia \par Femoral Neck -2.5 osteoporosis \par

## 2022-12-03 NOTE — HISTORY OF PRESENT ILLNESS
[FreeTextEntry1] : Barbra Toledo is a 62 year old female visiting the office today for consultation for osteoporosis. Pt reports that she was made aware of osteoporosis after a bone density which showed pt had low bone density . Pt was previously treated with Fosamax for many years. Pt then transitioned to Evista  by Dr. Yousif. \par \par Pt reports having an ankle fx, ulna fx. No family hx of osteoporosis \par \par Hx of estrogen replacement therapy. \par \par Pt has a hx of kidney stones. No recent recurrences. Pt is following with urology. \par \par Pt has no Hx of calcium issues. No Hx of anorexia nervosa. No ulcers or bleeding. No unusual risks for osteoporosis such as calcium disorders.  No chronic prednisone use. No Hx of Paget's disease. No Hx of DVT or PE. Up to date with routine care. Last DDS within 6 months

## 2022-12-03 NOTE — CONSULT LETTER
[Dear  ___] : Dear  [unfilled], [Consult Letter:] : I had the pleasure of evaluating your patient, [unfilled]. [( Thank you for referring [unfilled] for consultation for _____ )] : Thank you for referring [unfilled] for consultation for [unfilled] [Please see my note below.] : Please see my note below. [Consult Closing:] : Thank you very much for allowing me to participate in the care of this patient.  If you have any questions, please do not hesitate to contact me. [Sincerely,] : Sincerely, [DrJuan C  ___] : Dr. ALEGRIA [FreeTextEntry2] : Dr. Steven M Goldberg\par [address], \[place] [address] [FreeTextEntry3] : Dr. Nathanael Carmichael

## 2022-12-03 NOTE — END OF VISIT
[FreeTextEntry3] : This note was written by Jelly Salvador on ( November 30, 2022) acting as a medical scribe for Dr. Carmichael This note was authored by the medical scribe for me. I have reviewed, edited, and revised the note as needed. I am in agreement with the exam findings, imaging findings, and treatment plan.  Nathanael Carmichael MD

## 2022-12-05 LAB — COLLAGEN CTX SERPL-MCNC: 169 PG/ML

## 2023-03-30 ENCOUNTER — APPOINTMENT (OUTPATIENT)
Dept: ENDOCRINOLOGY | Facility: CLINIC | Age: 63
End: 2023-03-30

## 2023-04-03 ENCOUNTER — APPOINTMENT (OUTPATIENT)
Dept: ENDOCRINOLOGY | Facility: CLINIC | Age: 63
End: 2023-04-03

## 2023-04-11 ENCOUNTER — APPOINTMENT (OUTPATIENT)
Dept: ENDOCRINOLOGY | Facility: CLINIC | Age: 63
End: 2023-04-11
Payer: COMMERCIAL

## 2023-04-11 VITALS
BODY MASS INDEX: 20.38 KG/M2 | HEART RATE: 65 BPM | DIASTOLIC BLOOD PRESSURE: 62 MMHG | OXYGEN SATURATION: 96 % | SYSTOLIC BLOOD PRESSURE: 94 MMHG | WEIGHT: 115 LBS | HEIGHT: 63 IN | RESPIRATION RATE: 14 BRPM

## 2023-04-11 PROCEDURE — 99213 OFFICE O/P EST LOW 20 MIN: CPT

## 2023-04-12 NOTE — HISTORY OF PRESENT ILLNESS
[FreeTextEntry1] : Began Actonel 11/2022. taking correctly tolerating well No UGI sx. No thigh pain. Last DDS few mos ago. No interval fractures. \par In PT for wrist weakness, new \par Stopped Evista \par  . Pt reports that she was made aware of osteoporosis after a bone density which showed pt had low bone density . Pt was previously treated with Fosamax for many years. Pt then transitioned to Evista  by Dr. Yousif. \par \par Pt reports having an ankle fx, ulna fx. No family hx of osteoporosis \par \par Hx of estrogen replacement therapy. \par \par Pt has a hx of kidney stones. No recent recurrences. Pt is following with urology. \par \par Pt has no Hx of calcium issues. No Hx of anorexia nervosa. No ulcers or bleeding. No unusual risks for osteoporosis such as calcium disorders.  No chronic prednisone use. No Hx of Paget's disease. No Hx of DVT or PE. Up to date with routine care. Last DDS within 6 months

## 2023-04-12 NOTE — ASSESSMENT
[Bisphosphonate Therapy] : Risks and benefits of bisphosphonate therapy were  discussed with the patient including gastroesophageal irritation, osteonecrosis of the jaw, and atypical femur fractures, and acute phase reaction [Bisphosphonates] : The patient was instructed to take bisphosphonates on an empty stomach with a full glass of water,and wait at least 30 minutes before eating or lying down [FreeTextEntry1] : 63 year old female with osteoporosis . \par Began Actonel 11/2022. taking correctly tolerating well No UGI sx. No thigh pain. Last DDS few mos ago. No interval fractures. \par In PT for wrist weakness, new \par Stopped Evista \par  \par Imp: tolerating Actonel. \par Check collagen cross-links to assess efficacy of anti-resorptive Rx. Pt advised that this may not be covered by insurance. \par Pharmacological Management of Osteoporosis in Postmenopausal Women: An Endocrine Society  Clinical Practice Guideline. Edgardo R, Peter TIPTON., Balwinder DM, Davin AM, Elena MH, Luis Alberto ESPARZA. J Clin Endocrinol Metab. 2019  May 1;104(5):5507-2929, Also see  Update Luis Alberto et al JCEM 2020, 105: [phone] \par Follow up in December 2022 with repeat bone density test

## 2023-04-29 LAB
COLLAGEN NTX UR-SCNC: 111 NMOL BCE
COLLAGEN NTX/CREAT UR-SRTO: 18
CREAT UR-MCNC: 68.9 MG/DL
INTERPRETIVE GUIDE:: NORMAL

## 2023-11-09 ENCOUNTER — APPOINTMENT (OUTPATIENT)
Dept: GASTROENTEROLOGY | Facility: CLINIC | Age: 63
End: 2023-11-09
Payer: COMMERCIAL

## 2023-11-09 VITALS
HEART RATE: 74 BPM | TEMPERATURE: 97.9 F | OXYGEN SATURATION: 98 % | SYSTOLIC BLOOD PRESSURE: 102 MMHG | BODY MASS INDEX: 20.38 KG/M2 | WEIGHT: 115 LBS | DIASTOLIC BLOOD PRESSURE: 64 MMHG | HEIGHT: 63 IN

## 2023-11-09 DIAGNOSIS — Z83.79 FAMILY HISTORY OF OTHER DISEASES OF THE DIGESTIVE SYSTEM: ICD-10-CM

## 2023-11-09 DIAGNOSIS — R13.10 DYSPHAGIA, UNSPECIFIED: ICD-10-CM

## 2023-11-09 PROCEDURE — 99203 OFFICE O/P NEW LOW 30 MIN: CPT

## 2023-11-09 RX ORDER — ESOMEPRAZOLE MAGNESIUM 40 MG/1
40 CAPSULE, DELAYED RELEASE ORAL
Qty: 30 | Refills: 3 | Status: ACTIVE | COMMUNITY
Start: 2023-11-09 | End: 1900-01-01

## 2023-12-29 ENCOUNTER — APPOINTMENT (OUTPATIENT)
Dept: ENDOCRINOLOGY | Facility: CLINIC | Age: 63
End: 2023-12-29
Payer: COMMERCIAL

## 2023-12-29 VITALS — BODY MASS INDEX: 20.38 KG/M2 | HEIGHT: 63 IN | WEIGHT: 115 LBS

## 2023-12-29 VITALS — HEART RATE: 82 BPM | DIASTOLIC BLOOD PRESSURE: 60 MMHG | OXYGEN SATURATION: 95 % | SYSTOLIC BLOOD PRESSURE: 100 MMHG

## 2023-12-29 DIAGNOSIS — M81.0 AGE-RELATED OSTEOPOROSIS W/OUT CURRENT PATHOLOGICAL FRACTURE: ICD-10-CM

## 2023-12-29 PROCEDURE — 99214 OFFICE O/P EST MOD 30 MIN: CPT | Mod: 25

## 2023-12-29 PROCEDURE — 77080 DXA BONE DENSITY AXIAL: CPT

## 2023-12-29 PROCEDURE — ZZZZZ: CPT

## 2023-12-29 RX ORDER — RISEDRONATE SODIUM 150 MG/1
150 TABLET, FILM COATED ORAL
Qty: 1 | Refills: 3 | Status: DISCONTINUED | COMMUNITY
Start: 2022-11-30 | End: 2023-12-29

## 2023-12-29 RX ORDER — RALOXIFENE HYDROCHLORIDE 60 MG/1
60 TABLET, FILM COATED ORAL
Qty: 30 | Refills: 0 | Status: DISCONTINUED | COMMUNITY
Start: 2021-09-08 | End: 2023-12-29

## 2023-12-29 RX ADMIN — ZOLEDRONIC ACID 5 MG/100ML: 5 INJECTION, SOLUTION INTRAVENOUS at 00:00

## 2023-12-29 NOTE — HISTORY OF PRESENT ILLNESS
[FreeTextEntry1] : Pt returns for a follow-up visit for osteoporosis. No major surgeries, hospitalizations, fractures  . Pt has acid reflux and is seeing Dr. Romero. Added PPI.   Pt stopped Evista November 2022. Began Actonel 11/2022. taking correctly tolerating well  . No thigh pain. Last DDS few mos ago. No interval fractures.   Pt reports that she was made aware of osteoporosis after a bone density which showed pt had low bone density. Pt was previously treated with Fosamax for many years. Pt then transitioned to Evista  by Dr. Yousif.   Pt reports having an ankle fx, ulna fx. No family hx of osteoporosis   Hx of estrogen replacement therapy.   Pt has a hx of kidney stones. No recent recurrences. Pt is following with urology.   Pt has no Hx of calcium issues. No Hx of anorexia nervosa. No ulcers or bleeding. No unusual risks for osteoporosis such as calcium disorders.  No chronic prednisone use. No Hx of Paget's disease. No Hx of DVT or PE. Up to date with routine care. Last DDS within 6 months

## 2023-12-29 NOTE — PROCEDURE
[FreeTextEntry1] :  Bone Mineral Density: 12/29/2023 Indication: Comparison to 2022, assess response to medication to outside bone density  Spine: -2.9, osteoporosis, -3.0 prior report Total hip:  -2.4, osteopenia, no significant change Femoral neck: -2.3, osteopenia, no significant change Proximal radius: -3.1, osteoporosis, no significant change  Bone Density August 2022  Spine -3.0 osteoporosis  Total Hip -2.3 osteopenia  Femoral Neck -2.5 osteoporosis

## 2023-12-29 NOTE — END OF VISIT
[FreeTextEntry3] : This note was authored by the medical scribe for me. I have reviewed, edited, and revised the note as needed. I am in agreement with the exam findings, imaging findings, and treatment plan.  Nathanael Carmichael MD

## 2023-12-29 NOTE — ASSESSMENT
[Bisphosphonate Therapy] : Risks and benefits of bisphosphonate therapy were  discussed with the patient including gastroesophageal irritation, osteonecrosis of the jaw, and atypical femur fractures, and acute phase reaction [Bisphosphonates] : The patient was instructed to take bisphosphonates on an empty stomach with a full glass of water,and wait at least 30 minutes before eating or lying down [FreeTextEntry1] : 64 y/o female with osteoporosis.  Pt stopped Evista November 2022. Began Actonel 11/2022. Taking correctly tolerating well.  . No thigh pain. Last DDS few mos ago. No interval fractures.   Bone density 2023 stable. Recommended pt to switch to IV Reclast due to experiencing acid reflux. All questions were answered. Rx information handout provided. The patient understands and elects to start IV Reclast. Referred to rheumatology infusion center. Consent signed.  Call Dr. Steven Goldberg for labs  Pharmacological Management of Osteoporosis in Postmenopausal Women: An Endocrine Society Clinical Practice Guideline. Edgardo R, Peter TIPTON., Balwinder DM, Jin AM, Elena MH, Luis Alberto ESPARZA. J Clin Endocrinol Metab. 2019 May 1;104(5):7548-2165, Also see Update Luis Alberto et al JCEM 2020, 105: [phone]  F/u in 1 year and repeat BMD

## 2024-01-10 ENCOUNTER — OUTPATIENT (OUTPATIENT)
Dept: OUTPATIENT SERVICES | Facility: HOSPITAL | Age: 64
LOS: 1 days | End: 2024-01-10
Payer: COMMERCIAL

## 2024-01-10 ENCOUNTER — APPOINTMENT (OUTPATIENT)
Dept: RADIOLOGY | Facility: HOSPITAL | Age: 64
End: 2024-01-10

## 2024-01-10 DIAGNOSIS — Z98.891 HISTORY OF UTERINE SCAR FROM PREVIOUS SURGERY: Chronic | ICD-10-CM

## 2024-01-10 DIAGNOSIS — R13.10 DYSPHAGIA, UNSPECIFIED: ICD-10-CM

## 2024-01-10 PROCEDURE — 74220 X-RAY XM ESOPHAGUS 1CNTRST: CPT

## 2024-01-10 PROCEDURE — 74220 X-RAY XM ESOPHAGUS 1CNTRST: CPT | Mod: 26

## 2024-02-14 ENCOUNTER — APPOINTMENT (OUTPATIENT)
Dept: GASTROENTEROLOGY | Facility: CLINIC | Age: 64
End: 2024-02-14
Payer: COMMERCIAL

## 2024-02-14 ENCOUNTER — LABORATORY RESULT (OUTPATIENT)
Age: 64
End: 2024-02-14

## 2024-02-14 PROCEDURE — 43239 EGD BIOPSY SINGLE/MULTIPLE: CPT

## 2024-03-14 RX ORDER — IBANDRONATE SODIUM 150 MG/1
150 TABLET ORAL
Qty: 1 | Refills: 3 | Status: ACTIVE | COMMUNITY
Start: 2024-03-14 | End: 1900-01-01

## 2024-03-18 ENCOUNTER — APPOINTMENT (OUTPATIENT)
Dept: RHEUMATOLOGY | Facility: CLINIC | Age: 64
End: 2024-03-18

## 2024-03-20 ENCOUNTER — APPOINTMENT (OUTPATIENT)
Dept: GASTROENTEROLOGY | Facility: CLINIC | Age: 64
End: 2024-03-20
Payer: COMMERCIAL

## 2024-03-20 DIAGNOSIS — K21.9 GASTRO-ESOPHAGEAL REFLUX DISEASE W/OUT ESOPHAGITIS: ICD-10-CM

## 2024-03-20 PROCEDURE — 99442: CPT

## 2024-03-20 NOTE — HISTORY OF PRESENT ILLNESS
[Home] : at home, [unfilled] , at the time of the visit. [Medical Office: ([facility])___] : at the medical office located in  [Verbal consent obtained from patient] : the patient, [unfilled] [FreeTextEntry1] : Patient had difficulty connecting to the video platform despite multiple  attempts continued with telephonic visit  Not taking her medication for GERD (Nexium had been prescribed) Not taking any antacids  Feels alot better overall - limiting her coffee intake; avoiding triggers  No recent dysphagia

## 2024-03-20 NOTE — ASSESSMENT
[FreeTextEntry1] :  Chronic GERD, intermittent dysphagia  Normal esophagram other than presence of GERD EGD reveals no Starkey's esophagus, no esophagitis  Can continue to avoid triggers.  Recommended taking antacids on an as-needed basis, unless her symptoms become so frequent that a daily prophylactic antisecretory medication would be more helpful.  She can follow-up on an as-needed basis as well.

## 2024-05-20 ENCOUNTER — APPOINTMENT (OUTPATIENT)
Dept: RHEUMATOLOGY | Facility: CLINIC | Age: 64
End: 2024-05-20
Payer: MEDICARE

## 2024-05-20 VITALS
TEMPERATURE: 98.2 F | SYSTOLIC BLOOD PRESSURE: 95 MMHG | DIASTOLIC BLOOD PRESSURE: 60 MMHG | OXYGEN SATURATION: 95 % | HEART RATE: 76 BPM | RESPIRATION RATE: 16 BRPM

## 2024-05-20 VITALS — OXYGEN SATURATION: 95 % | HEART RATE: 75 BPM | SYSTOLIC BLOOD PRESSURE: 92 MMHG | DIASTOLIC BLOOD PRESSURE: 56 MMHG

## 2024-05-20 PROCEDURE — 96374 THER/PROPH/DIAG INJ IV PUSH: CPT

## 2024-05-20 RX ORDER — ZOLEDRONIC ACID 5 MG/100ML
5 INJECTION INTRAVENOUS
Qty: 0 | Refills: 0 | Status: COMPLETED | OUTPATIENT
Start: 2023-12-29

## 2024-05-20 NOTE — HISTORY OF PRESENT ILLNESS
[Denies] : Denies [No] : No [N/A] : N/A [Yes] : Yes [24g] : 24g [Start Time: ___] : Medication Start Time: [unfilled] [End Time: ___] : Medication End Time: [unfilled] [Medication Name: ___] : Medication Name: [unfilled] [Total Amount Administered: ___] : Total Amount Administered: [unfilled] [IV discontinued. Intact. No signs or symptoms of IV complications noted. Time: ___] : IV discontinued. Intact. No signs or symptoms of IV complications noted. Time: [unfilled] [Patient  instructed to seek medical attention with signs and symptoms of adverse effects] : Patient  instructed to seek medical attention with signs and symptoms of adverse effects [Patient left unit in no acute distress] : Patient left unit in no acute distress [Medications administered as ordered and tolerated well.] : Medications administered as ordered and tolerated well. [de-identified] : right arm cephalic vein  [de-identified] : Patient presents for Zoledronic Acid infusion in [facility]. Medication education provided; patient verbalized understanding and consented to today's infusion. Patient tolerated the infusion with no immediate AEs. Patient left the unit in [facility].

## 2024-06-28 ENCOUNTER — NON-APPOINTMENT (OUTPATIENT)
Age: 64
End: 2024-06-28

## 2024-06-28 ENCOUNTER — APPOINTMENT (OUTPATIENT)
Dept: OPHTHALMOLOGY | Facility: CLINIC | Age: 64
End: 2024-06-28
Payer: MEDICARE

## 2024-06-28 PROCEDURE — 92134 CPTRZ OPH DX IMG PST SGM RTA: CPT

## 2024-06-28 PROCEDURE — 92004 COMPRE OPH EXAM NEW PT 1/>: CPT

## 2024-12-31 ENCOUNTER — APPOINTMENT (OUTPATIENT)
Dept: ENDOCRINOLOGY | Facility: CLINIC | Age: 64
End: 2024-12-31
Payer: MEDICARE

## 2024-12-31 VITALS
SYSTOLIC BLOOD PRESSURE: 104 MMHG | HEIGHT: 63 IN | BODY MASS INDEX: 20.38 KG/M2 | DIASTOLIC BLOOD PRESSURE: 68 MMHG | WEIGHT: 115 LBS | HEART RATE: 74 BPM | OXYGEN SATURATION: 98 %

## 2024-12-31 DIAGNOSIS — M81.0 AGE-RELATED OSTEOPOROSIS W/OUT CURRENT PATHOLOGICAL FRACTURE: ICD-10-CM

## 2024-12-31 PROCEDURE — G2211 COMPLEX E/M VISIT ADD ON: CPT

## 2024-12-31 PROCEDURE — 99203 OFFICE O/P NEW LOW 30 MIN: CPT

## 2024-12-31 RX ADMIN — ZOLEDRONIC ACID 5 MG/100ML: 5 INJECTION INTRAVENOUS at 00:00

## 2025-05-19 ENCOUNTER — APPOINTMENT (OUTPATIENT)
Dept: ORTHOPEDIC SURGERY | Facility: CLINIC | Age: 65
End: 2025-05-19

## 2025-05-21 ENCOUNTER — APPOINTMENT (OUTPATIENT)
Dept: RHEUMATOLOGY | Facility: CLINIC | Age: 65
End: 2025-05-21
Payer: MEDICARE

## 2025-05-21 VITALS — SYSTOLIC BLOOD PRESSURE: 97 MMHG | DIASTOLIC BLOOD PRESSURE: 60 MMHG | OXYGEN SATURATION: 100 % | HEART RATE: 68 BPM

## 2025-05-21 VITALS
RESPIRATION RATE: 16 BRPM | HEART RATE: 65 BPM | TEMPERATURE: 98 F | DIASTOLIC BLOOD PRESSURE: 59 MMHG | OXYGEN SATURATION: 100 % | SYSTOLIC BLOOD PRESSURE: 89 MMHG

## 2025-05-21 PROCEDURE — 96374 THER/PROPH/DIAG INJ IV PUSH: CPT

## 2025-09-11 ENCOUNTER — APPOINTMENT (OUTPATIENT)
Dept: OPHTHALMOLOGY | Facility: CLINIC | Age: 65
End: 2025-09-11